# Patient Record
Sex: MALE | Race: WHITE | NOT HISPANIC OR LATINO | Employment: PART TIME | URBAN - METROPOLITAN AREA
[De-identification: names, ages, dates, MRNs, and addresses within clinical notes are randomized per-mention and may not be internally consistent; named-entity substitution may affect disease eponyms.]

---

## 2017-03-20 ENCOUNTER — FOLLOW UP (OUTPATIENT)
Dept: URBAN - METROPOLITAN AREA CLINIC 26 | Facility: CLINIC | Age: 72
End: 2017-03-20

## 2017-03-20 VITALS — DIASTOLIC BLOOD PRESSURE: 88 MMHG | HEART RATE: 74 BPM | SYSTOLIC BLOOD PRESSURE: 134 MMHG | HEIGHT: 60 IN

## 2017-03-20 DIAGNOSIS — H35.54: ICD-10-CM

## 2017-03-20 DIAGNOSIS — H43.812: ICD-10-CM

## 2017-03-20 DIAGNOSIS — H04.123: ICD-10-CM

## 2017-03-20 DIAGNOSIS — H02.836: ICD-10-CM

## 2017-03-20 DIAGNOSIS — H25.13: ICD-10-CM

## 2017-03-20 DIAGNOSIS — H02.833: ICD-10-CM

## 2017-03-20 DIAGNOSIS — H35.3130: ICD-10-CM

## 2017-03-20 PROCEDURE — G8417 CALC BMI ABV UP PARAM F/U: HCPCS

## 2017-03-20 PROCEDURE — 2019F DILATED MACUL EXAM DONE: CPT

## 2017-03-20 PROCEDURE — 92014 COMPRE OPH EXAM EST PT 1/>: CPT

## 2017-03-20 PROCEDURE — 1036F TOBACCO NON-USER: CPT

## 2017-03-20 PROCEDURE — 4177F TALK PT/CRGVR RE AREDS PREV: CPT

## 2017-03-20 PROCEDURE — 92250 FUNDUS PHOTOGRAPHY W/I&R: CPT

## 2017-03-20 PROCEDURE — 92235 FLUORESCEIN ANGRPH MLTIFRAME: CPT

## 2017-03-20 ASSESSMENT — TONOMETRY
OS_IOP_MMHG: 14
OD_IOP_MMHG: 14

## 2017-03-20 ASSESSMENT — VISUAL ACUITY
OS_SC: 20/40-2
OD_PH: 20/30
OD_SC: 20/40-2

## 2017-10-04 ENCOUNTER — FOLLOW UP (OUTPATIENT)
Dept: URBAN - METROPOLITAN AREA CLINIC 26 | Facility: CLINIC | Age: 72
End: 2017-10-04

## 2017-10-04 VITALS — DIASTOLIC BLOOD PRESSURE: 82 MMHG | SYSTOLIC BLOOD PRESSURE: 139 MMHG | HEART RATE: 66 BPM | HEIGHT: 60 IN

## 2017-10-04 DIAGNOSIS — H35.54: ICD-10-CM

## 2017-10-04 DIAGNOSIS — H35.3130: ICD-10-CM

## 2017-10-04 DIAGNOSIS — H43.812: ICD-10-CM

## 2017-10-04 PROCEDURE — 1036F TOBACCO NON-USER: CPT

## 2017-10-04 PROCEDURE — 4177F TALK PT/CRGVR RE AREDS PREV: CPT

## 2017-10-04 PROCEDURE — 92134 CPTRZ OPH DX IMG PST SGM RTA: CPT

## 2017-10-04 PROCEDURE — 2019F DILATED MACUL EXAM DONE: CPT

## 2017-10-04 PROCEDURE — 92250 FUNDUS PHOTOGRAPHY W/I&R: CPT

## 2017-10-04 PROCEDURE — G8427 DOCREV CUR MEDS BY ELIG CLIN: HCPCS

## 2017-10-04 PROCEDURE — 92014 COMPRE OPH EXAM EST PT 1/>: CPT

## 2017-10-04 PROCEDURE — 92235 FLUORESCEIN ANGRPH MLTIFRAME: CPT

## 2017-10-04 ASSESSMENT — VISUAL ACUITY
OD_SC: 20/25
OS_SC: 20/50-
OS_PH: 20/30-

## 2017-10-04 ASSESSMENT — TONOMETRY
OS_IOP_MMHG: 11
OD_IOP_MMHG: 11

## 2018-08-28 ENCOUNTER — FOLLOW UP (OUTPATIENT)
Dept: URBAN - METROPOLITAN AREA CLINIC 26 | Facility: CLINIC | Age: 73
End: 2018-08-28

## 2018-08-28 VITALS
SYSTOLIC BLOOD PRESSURE: 118 MMHG | WEIGHT: 194 LBS | BODY MASS INDEX: 29.4 KG/M2 | HEART RATE: 83 BPM | DIASTOLIC BLOOD PRESSURE: 84 MMHG | HEIGHT: 68 IN

## 2018-08-28 DIAGNOSIS — H35.3130: ICD-10-CM

## 2018-08-28 DIAGNOSIS — H04.123: ICD-10-CM

## 2018-08-28 DIAGNOSIS — H35.54: ICD-10-CM

## 2018-08-28 DIAGNOSIS — H43.812: ICD-10-CM

## 2018-08-28 DIAGNOSIS — H02.833: ICD-10-CM

## 2018-08-28 DIAGNOSIS — H25.13: ICD-10-CM

## 2018-08-28 DIAGNOSIS — H02.836: ICD-10-CM

## 2018-08-28 PROCEDURE — 92250 FUNDUS PHOTOGRAPHY W/I&R: CPT

## 2018-08-28 PROCEDURE — 92134 CPTRZ OPH DX IMG PST SGM RTA: CPT

## 2018-08-28 PROCEDURE — 92014 COMPRE OPH EXAM EST PT 1/>: CPT

## 2018-08-28 PROCEDURE — 92235 FLUORESCEIN ANGRPH MLTIFRAME: CPT

## 2018-08-28 ASSESSMENT — TONOMETRY
OS_IOP_MMHG: 12
OD_IOP_MMHG: 17

## 2018-08-28 ASSESSMENT — VISUAL ACUITY
OS_PH: 20/40+2
OS_SC: 20/50-1
OD_SC: 20/30+2

## 2019-02-26 ENCOUNTER — FOLLOW UP (OUTPATIENT)
Dept: URBAN - METROPOLITAN AREA CLINIC 26 | Facility: CLINIC | Age: 74
End: 2019-02-26

## 2019-02-26 VITALS
DIASTOLIC BLOOD PRESSURE: 68 MMHG | HEIGHT: 68 IN | HEART RATE: 72 BPM | BODY MASS INDEX: 29.4 KG/M2 | WEIGHT: 194 LBS | SYSTOLIC BLOOD PRESSURE: 112 MMHG

## 2019-02-26 DIAGNOSIS — H02.833: ICD-10-CM

## 2019-02-26 DIAGNOSIS — H04.123: ICD-10-CM

## 2019-02-26 DIAGNOSIS — H02.836: ICD-10-CM

## 2019-02-26 DIAGNOSIS — H35.3132: ICD-10-CM

## 2019-02-26 DIAGNOSIS — H43.812: ICD-10-CM

## 2019-02-26 DIAGNOSIS — H35.54: ICD-10-CM

## 2019-02-26 DIAGNOSIS — H25.13: ICD-10-CM

## 2019-02-26 PROCEDURE — 92014 COMPRE OPH EXAM EST PT 1/>: CPT

## 2019-02-26 PROCEDURE — 92250 FUNDUS PHOTOGRAPHY W/I&R: CPT

## 2019-02-26 PROCEDURE — 92235 FLUORESCEIN ANGRPH MLTIFRAME: CPT

## 2019-02-26 PROCEDURE — 92134 CPTRZ OPH DX IMG PST SGM RTA: CPT

## 2019-02-26 ASSESSMENT — VISUAL ACUITY
OS_PH: 20/40
OD_SC: 20/30
OS_SC: 20/60-2

## 2019-02-26 ASSESSMENT — TONOMETRY
OS_IOP_MMHG: 11
OD_IOP_MMHG: 10

## 2019-10-03 ENCOUNTER — FOLLOW UP (OUTPATIENT)
Dept: URBAN - METROPOLITAN AREA CLINIC 26 | Facility: CLINIC | Age: 74
End: 2019-10-03

## 2019-10-03 VITALS — HEIGHT: 60 IN | HEART RATE: 95 BPM | SYSTOLIC BLOOD PRESSURE: 128 MMHG | DIASTOLIC BLOOD PRESSURE: 76 MMHG

## 2019-10-03 DIAGNOSIS — H35.3112: ICD-10-CM

## 2019-10-03 DIAGNOSIS — H35.54: ICD-10-CM

## 2019-10-03 DIAGNOSIS — H43.812: ICD-10-CM

## 2019-10-03 DIAGNOSIS — H35.3221: ICD-10-CM

## 2019-10-03 PROCEDURE — 92134 CPTRZ OPH DX IMG PST SGM RTA: CPT

## 2019-10-03 PROCEDURE — 92250 FUNDUS PHOTOGRAPHY W/I&R: CPT

## 2019-10-03 PROCEDURE — 92014 COMPRE OPH EXAM EST PT 1/>: CPT

## 2019-10-03 PROCEDURE — 67028 INJECTION EYE DRUG: CPT

## 2019-10-03 PROCEDURE — 92235 FLUORESCEIN ANGRPH MLTIFRAME: CPT

## 2019-10-03 ASSESSMENT — TONOMETRY
OS_IOP_MMHG: 10
OD_IOP_MMHG: 12

## 2019-10-03 ASSESSMENT — VISUAL ACUITY
OD_SC: 20/40+1
OS_PH: 20/50
OS_SC: 20/70-2

## 2019-11-04 ENCOUNTER — CLINICAL PROCEDURE AND DIAGNOSTIC TESTING ONLY (OUTPATIENT)
Dept: URBAN - METROPOLITAN AREA CLINIC 26 | Facility: CLINIC | Age: 74
End: 2019-11-04

## 2019-11-04 DIAGNOSIS — H35.3112: ICD-10-CM

## 2019-11-04 DIAGNOSIS — H35.3221: ICD-10-CM

## 2019-11-04 PROCEDURE — 92134 CPTRZ OPH DX IMG PST SGM RTA: CPT

## 2019-11-04 PROCEDURE — 67028 INJECTION EYE DRUG: CPT

## 2019-11-04 PROCEDURE — 92250 FUNDUS PHOTOGRAPHY W/I&R: CPT

## 2019-11-04 ASSESSMENT — TONOMETRY: OS_IOP_MMHG: 14

## 2019-11-04 ASSESSMENT — VISUAL ACUITY
OD_SC: 20/40
OS_SC: 20/70

## 2019-12-04 ENCOUNTER — CLINICAL PROCEDURE AND DIAGNOSTIC TESTING ONLY (OUTPATIENT)
Dept: URBAN - METROPOLITAN AREA CLINIC 26 | Facility: CLINIC | Age: 74
End: 2019-12-04

## 2019-12-04 DIAGNOSIS — H35.3112: ICD-10-CM

## 2019-12-04 DIAGNOSIS — H35.3221: ICD-10-CM

## 2019-12-04 PROCEDURE — 67028 INJECTION EYE DRUG: CPT

## 2019-12-04 PROCEDURE — 92250 FUNDUS PHOTOGRAPHY W/I&R: CPT

## 2019-12-04 ASSESSMENT — VISUAL ACUITY: OS_SC: 20/70-2

## 2019-12-04 ASSESSMENT — TONOMETRY: OS_IOP_MMHG: 14

## 2020-01-02 ENCOUNTER — CLINICAL PROCEDURE AND DIAGNOSTIC TESTING ONLY (OUTPATIENT)
Dept: URBAN - METROPOLITAN AREA CLINIC 26 | Facility: CLINIC | Age: 75
End: 2020-01-02

## 2020-01-02 DIAGNOSIS — H35.3112: ICD-10-CM

## 2020-01-02 DIAGNOSIS — H35.3221: ICD-10-CM

## 2020-01-02 PROCEDURE — 67028 INJECTION EYE DRUG: CPT

## 2020-01-02 PROCEDURE — 92134 CPTRZ OPH DX IMG PST SGM RTA: CPT

## 2020-01-02 ASSESSMENT — VISUAL ACUITY: OS_SC: 20/80-2

## 2020-01-02 ASSESSMENT — TONOMETRY: OS_IOP_MMHG: 19

## 2020-02-03 ENCOUNTER — FOLLOW UP AND POST INJECTION EVALUATION (OUTPATIENT)
Dept: URBAN - METROPOLITAN AREA CLINIC 26 | Facility: CLINIC | Age: 75
End: 2020-02-03

## 2020-02-03 VITALS
WEIGHT: 197.4 LBS | HEART RATE: 80 BPM | HEIGHT: 68 IN | BODY MASS INDEX: 29.92 KG/M2 | SYSTOLIC BLOOD PRESSURE: 128 MMHG | DIASTOLIC BLOOD PRESSURE: 91 MMHG

## 2020-02-03 DIAGNOSIS — H02.833: ICD-10-CM

## 2020-02-03 DIAGNOSIS — H35.54: ICD-10-CM

## 2020-02-03 DIAGNOSIS — H43.812: ICD-10-CM

## 2020-02-03 DIAGNOSIS — H25.13: ICD-10-CM

## 2020-02-03 DIAGNOSIS — H02.836: ICD-10-CM

## 2020-02-03 DIAGNOSIS — H04.123: ICD-10-CM

## 2020-02-03 DIAGNOSIS — H35.3221: ICD-10-CM

## 2020-02-03 DIAGNOSIS — H35.3112: ICD-10-CM

## 2020-02-03 PROCEDURE — 92134 CPTRZ OPH DX IMG PST SGM RTA: CPT

## 2020-02-03 PROCEDURE — 92014 COMPRE OPH EXAM EST PT 1/>: CPT

## 2020-02-03 PROCEDURE — 67028 INJECTION EYE DRUG: CPT

## 2020-02-03 PROCEDURE — 92250 FUNDUS PHOTOGRAPHY W/I&R: CPT

## 2020-02-03 PROCEDURE — 92235 FLUORESCEIN ANGRPH MLTIFRAME: CPT

## 2020-02-03 ASSESSMENT — VISUAL ACUITY
OD_SC: 20/30+1
OS_SC: 20/80

## 2020-02-03 ASSESSMENT — TONOMETRY
OD_IOP_MMHG: 9
OS_IOP_MMHG: 9

## 2020-03-09 ENCOUNTER — CLINICAL PROCEDURE AND DIAGNOSTIC TESTING ONLY (OUTPATIENT)
Dept: URBAN - METROPOLITAN AREA CLINIC 26 | Facility: CLINIC | Age: 75
End: 2020-03-09

## 2020-03-09 DIAGNOSIS — H35.3221: ICD-10-CM

## 2020-03-09 DIAGNOSIS — H35.3112: ICD-10-CM

## 2020-03-09 PROCEDURE — 67028 INJECTION EYE DRUG: CPT

## 2020-03-09 PROCEDURE — 92134 CPTRZ OPH DX IMG PST SGM RTA: CPT

## 2020-03-09 PROCEDURE — 92250 FUNDUS PHOTOGRAPHY W/I&R: CPT

## 2020-03-09 ASSESSMENT — VISUAL ACUITY: OS_SC: 20/80

## 2020-04-20 ENCOUNTER — CLINICAL PROCEDURE AND DIAGNOSTIC TESTING ONLY (OUTPATIENT)
Dept: URBAN - METROPOLITAN AREA CLINIC 26 | Facility: CLINIC | Age: 75
End: 2020-04-20

## 2020-04-20 DIAGNOSIS — H35.3221: ICD-10-CM

## 2020-04-20 DIAGNOSIS — H35.3112: ICD-10-CM

## 2020-04-20 PROCEDURE — 92250 FUNDUS PHOTOGRAPHY W/I&R: CPT

## 2020-04-20 PROCEDURE — 67028 INJECTION EYE DRUG: CPT

## 2020-04-20 PROCEDURE — 92134 CPTRZ OPH DX IMG PST SGM RTA: CPT

## 2020-04-20 ASSESSMENT — VISUAL ACUITY: OS_SC: 20/60

## 2020-04-20 ASSESSMENT — TONOMETRY: OS_IOP_MMHG: 11

## 2020-06-08 ENCOUNTER — FOLLOW UP (OUTPATIENT)
Dept: URBAN - METROPOLITAN AREA CLINIC 26 | Facility: CLINIC | Age: 75
End: 2020-06-08

## 2020-06-08 DIAGNOSIS — H35.3221: ICD-10-CM

## 2020-06-08 DIAGNOSIS — H43.812: ICD-10-CM

## 2020-06-08 DIAGNOSIS — H04.123: ICD-10-CM

## 2020-06-08 DIAGNOSIS — H02.836: ICD-10-CM

## 2020-06-08 DIAGNOSIS — H35.3112: ICD-10-CM

## 2020-06-08 DIAGNOSIS — H02.833: ICD-10-CM

## 2020-06-08 DIAGNOSIS — H35.54: ICD-10-CM

## 2020-06-08 DIAGNOSIS — H25.13: ICD-10-CM

## 2020-06-08 PROCEDURE — 92134 CPTRZ OPH DX IMG PST SGM RTA: CPT

## 2020-06-08 PROCEDURE — 67028 INJECTION EYE DRUG: CPT

## 2020-06-08 PROCEDURE — 92250 FUNDUS PHOTOGRAPHY W/I&R: CPT

## 2020-06-08 PROCEDURE — 92014 COMPRE OPH EXAM EST PT 1/>: CPT

## 2020-06-08 ASSESSMENT — TONOMETRY
OS_IOP_MMHG: 10
OD_IOP_MMHG: 08

## 2020-06-08 ASSESSMENT — VISUAL ACUITY
OS_PH: 20/50-1
OS_SC: 20/60-2
OD_SC: 20/25-1

## 2020-08-03 ENCOUNTER — FOLLOW UP AND POST INJECTION EVALUATION (OUTPATIENT)
Dept: URBAN - METROPOLITAN AREA CLINIC 26 | Facility: CLINIC | Age: 75
End: 2020-08-03

## 2020-08-03 DIAGNOSIS — H35.3221: ICD-10-CM

## 2020-08-03 DIAGNOSIS — H35.3112: ICD-10-CM

## 2020-08-03 PROCEDURE — 67028 INJECTION EYE DRUG: CPT

## 2020-08-03 PROCEDURE — 92134 CPTRZ OPH DX IMG PST SGM RTA: CPT

## 2020-08-03 PROCEDURE — 92250 FUNDUS PHOTOGRAPHY W/I&R: CPT

## 2020-08-03 ASSESSMENT — VISUAL ACUITY: OS_SC: 20/60-2

## 2020-08-03 ASSESSMENT — TONOMETRY: OS_IOP_MMHG: 10

## 2020-09-21 ENCOUNTER — CLINICAL PROCEDURE AND DIAGNOSTIC TESTING ONLY (OUTPATIENT)
Dept: URBAN - METROPOLITAN AREA CLINIC 26 | Facility: CLINIC | Age: 75
End: 2020-09-21

## 2020-09-21 DIAGNOSIS — H35.3221: ICD-10-CM

## 2020-09-21 DIAGNOSIS — H35.3112: ICD-10-CM

## 2020-09-21 PROCEDURE — 67028 INJECTION EYE DRUG: CPT

## 2020-09-21 PROCEDURE — 92250 FUNDUS PHOTOGRAPHY W/I&R: CPT

## 2020-09-21 PROCEDURE — 92134 CPTRZ OPH DX IMG PST SGM RTA: CPT

## 2020-09-21 ASSESSMENT — VISUAL ACUITY: OS_SC: 20/50-2

## 2020-09-21 ASSESSMENT — TONOMETRY: OS_IOP_MMHG: 09

## 2020-11-02 ENCOUNTER — FOLLOW UP AND POST INJECTION EVALUATION (OUTPATIENT)
Dept: URBAN - METROPOLITAN AREA CLINIC 26 | Facility: CLINIC | Age: 75
End: 2020-11-02

## 2020-11-02 DIAGNOSIS — H35.3112: ICD-10-CM

## 2020-11-02 DIAGNOSIS — H35.3221: ICD-10-CM

## 2020-11-02 DIAGNOSIS — H02.833: ICD-10-CM

## 2020-11-02 DIAGNOSIS — H25.13: ICD-10-CM

## 2020-11-02 DIAGNOSIS — H02.836: ICD-10-CM

## 2020-11-02 DIAGNOSIS — H04.123: ICD-10-CM

## 2020-11-02 DIAGNOSIS — H35.54: ICD-10-CM

## 2020-11-02 DIAGNOSIS — H43.812: ICD-10-CM

## 2020-11-02 PROCEDURE — 67028 INJECTION EYE DRUG: CPT

## 2020-11-02 PROCEDURE — 92014 COMPRE OPH EXAM EST PT 1/>: CPT

## 2020-11-02 PROCEDURE — 92250 FUNDUS PHOTOGRAPHY W/I&R: CPT

## 2020-11-02 PROCEDURE — 92134 CPTRZ OPH DX IMG PST SGM RTA: CPT

## 2020-11-02 ASSESSMENT — VISUAL ACUITY
OS_SC: 20/60-2
OS_PH: 20/40-1
OD_SC: 20/30+2

## 2020-11-02 ASSESSMENT — TONOMETRY
OS_IOP_MMHG: 10
OD_IOP_MMHG: 11

## 2020-12-07 ENCOUNTER — CLINICAL PROCEDURE AND DIAGNOSTIC TESTING ONLY (OUTPATIENT)
Dept: URBAN - METROPOLITAN AREA CLINIC 26 | Facility: CLINIC | Age: 75
End: 2020-12-07

## 2020-12-07 DIAGNOSIS — H35.3221: ICD-10-CM

## 2020-12-07 DIAGNOSIS — H35.3112: ICD-10-CM

## 2020-12-07 PROCEDURE — 92250 FUNDUS PHOTOGRAPHY W/I&R: CPT

## 2020-12-07 PROCEDURE — 67028 INJECTION EYE DRUG: CPT

## 2020-12-07 ASSESSMENT — VISUAL ACUITY: OS_SC: 20/70-1

## 2020-12-07 ASSESSMENT — TONOMETRY: OS_IOP_MMHG: 10

## 2021-01-12 ENCOUNTER — CLINICAL PROCEDURE AND DIAGNOSTIC TESTING ONLY (OUTPATIENT)
Dept: URBAN - METROPOLITAN AREA CLINIC 26 | Facility: CLINIC | Age: 76
End: 2021-01-12

## 2021-01-12 DIAGNOSIS — H35.3112: ICD-10-CM

## 2021-01-12 DIAGNOSIS — H35.3221: ICD-10-CM

## 2021-01-12 PROCEDURE — 92250 FUNDUS PHOTOGRAPHY W/I&R: CPT

## 2021-01-12 PROCEDURE — 92134 CPTRZ OPH DX IMG PST SGM RTA: CPT

## 2021-01-12 PROCEDURE — 67028 INJECTION EYE DRUG: CPT

## 2021-01-12 ASSESSMENT — TONOMETRY: OS_IOP_MMHG: 11

## 2021-01-12 ASSESSMENT — VISUAL ACUITY
OS_PH: 20/50+
OS_SC: 20/60

## 2021-02-22 ENCOUNTER — CLINICAL PROCEDURE AND DIAGNOSTIC TESTING ONLY (OUTPATIENT)
Dept: URBAN - METROPOLITAN AREA CLINIC 26 | Facility: CLINIC | Age: 76
End: 2021-02-22

## 2021-02-22 DIAGNOSIS — H35.3112: ICD-10-CM

## 2021-02-22 DIAGNOSIS — H35.3221: ICD-10-CM

## 2021-02-22 PROCEDURE — 92250 FUNDUS PHOTOGRAPHY W/I&R: CPT

## 2021-02-22 PROCEDURE — 92134 CPTRZ OPH DX IMG PST SGM RTA: CPT

## 2021-02-22 PROCEDURE — 67028 INJECTION EYE DRUG: CPT

## 2021-02-22 ASSESSMENT — VISUAL ACUITY: OS_SC: 20/50-1

## 2021-02-22 ASSESSMENT — TONOMETRY: OS_IOP_MMHG: 12

## 2021-03-30 ENCOUNTER — FOLLOW UP AND POST INJECTION EVALUATION (OUTPATIENT)
Dept: URBAN - METROPOLITAN AREA CLINIC 26 | Facility: CLINIC | Age: 76
End: 2021-03-30

## 2021-03-30 VITALS — WEIGHT: 186 LBS | BODY MASS INDEX: 28.19 KG/M2 | HEIGHT: 68 IN

## 2021-03-30 DIAGNOSIS — H35.3221: ICD-10-CM

## 2021-03-30 DIAGNOSIS — H43.812: ICD-10-CM

## 2021-03-30 DIAGNOSIS — H35.54: ICD-10-CM

## 2021-03-30 DIAGNOSIS — H35.3112: ICD-10-CM

## 2021-03-30 PROCEDURE — 92250 FUNDUS PHOTOGRAPHY W/I&R: CPT

## 2021-03-30 PROCEDURE — 92014 COMPRE OPH EXAM EST PT 1/>: CPT

## 2021-03-30 PROCEDURE — 92134 CPTRZ OPH DX IMG PST SGM RTA: CPT

## 2021-03-30 PROCEDURE — 67028 INJECTION EYE DRUG: CPT

## 2021-03-30 ASSESSMENT — VISUAL ACUITY
OD_SC: 20/30-1
OS_SC: 20/60+2
OS_PH: 20/50-1

## 2021-03-30 ASSESSMENT — TONOMETRY
OS_IOP_MMHG: 12
OD_IOP_MMHG: 19

## 2021-05-05 ENCOUNTER — CLINICAL PROCEDURE AND DIAGNOSTIC TESTING ONLY (OUTPATIENT)
Dept: URBAN - METROPOLITAN AREA CLINIC 26 | Facility: CLINIC | Age: 76
End: 2021-05-05

## 2021-05-05 DIAGNOSIS — H35.3112: ICD-10-CM

## 2021-05-05 DIAGNOSIS — H35.3221: ICD-10-CM

## 2021-05-05 PROCEDURE — 67028 INJECTION EYE DRUG: CPT

## 2021-05-05 PROCEDURE — 92134 CPTRZ OPH DX IMG PST SGM RTA: CPT

## 2021-05-05 PROCEDURE — 92250 FUNDUS PHOTOGRAPHY W/I&R: CPT

## 2021-05-05 ASSESSMENT — TONOMETRY: OS_IOP_MMHG: 16

## 2021-05-05 ASSESSMENT — VISUAL ACUITY: OS_SC: 20/60

## 2021-06-09 ENCOUNTER — CLINICAL PROCEDURE AND DIAGNOSTIC TESTING ONLY (OUTPATIENT)
Dept: URBAN - METROPOLITAN AREA CLINIC 26 | Facility: CLINIC | Age: 76
End: 2021-06-09

## 2021-06-09 DIAGNOSIS — H35.3221: ICD-10-CM

## 2021-06-09 DIAGNOSIS — H35.3112: ICD-10-CM

## 2021-06-09 PROCEDURE — 92250 FUNDUS PHOTOGRAPHY W/I&R: CPT

## 2021-06-09 PROCEDURE — 67028 INJECTION EYE DRUG: CPT

## 2021-06-09 PROCEDURE — 92134 CPTRZ OPH DX IMG PST SGM RTA: CPT

## 2021-06-09 ASSESSMENT — TONOMETRY: OS_IOP_MMHG: 11

## 2021-06-09 ASSESSMENT — VISUAL ACUITY: OS_SC: 20/60-2

## 2021-07-14 ENCOUNTER — CLINICAL PROCEDURE AND DIAGNOSTIC TESTING ONLY (OUTPATIENT)
Dept: URBAN - METROPOLITAN AREA CLINIC 26 | Facility: CLINIC | Age: 76
End: 2021-07-14

## 2021-07-14 DIAGNOSIS — H35.3221: ICD-10-CM

## 2021-07-14 DIAGNOSIS — H35.3112: ICD-10-CM

## 2021-07-14 PROCEDURE — 92134 CPTRZ OPH DX IMG PST SGM RTA: CPT

## 2021-07-14 PROCEDURE — 67028 INJECTION EYE DRUG: CPT

## 2021-07-14 PROCEDURE — 92250 FUNDUS PHOTOGRAPHY W/I&R: CPT

## 2021-07-14 ASSESSMENT — VISUAL ACUITY: OS_SC: 20/60-2

## 2021-07-14 ASSESSMENT — TONOMETRY: OS_IOP_MMHG: 13

## 2021-08-24 ENCOUNTER — FOLLOW UP AND POST INJECTION EVALUATION (OUTPATIENT)
Dept: URBAN - METROPOLITAN AREA CLINIC 26 | Facility: CLINIC | Age: 76
End: 2021-08-24

## 2021-08-24 DIAGNOSIS — H35.54: ICD-10-CM

## 2021-08-24 DIAGNOSIS — H35.3221: ICD-10-CM

## 2021-08-24 DIAGNOSIS — H04.123: ICD-10-CM

## 2021-08-24 DIAGNOSIS — H43.812: ICD-10-CM

## 2021-08-24 DIAGNOSIS — H35.3112: ICD-10-CM

## 2021-08-24 PROCEDURE — 92250 FUNDUS PHOTOGRAPHY W/I&R: CPT

## 2021-08-24 PROCEDURE — 67028 INJECTION EYE DRUG: CPT

## 2021-08-24 PROCEDURE — 92134 CPTRZ OPH DX IMG PST SGM RTA: CPT

## 2021-08-24 PROCEDURE — 92014 COMPRE OPH EXAM EST PT 1/>: CPT

## 2021-08-24 ASSESSMENT — VISUAL ACUITY
OD_SC: 20/25-1
OS_PH: 20/50-1
OS_SC: 20/80-1

## 2021-08-24 ASSESSMENT — TONOMETRY
OS_IOP_MMHG: 10
OD_IOP_MMHG: 10

## 2021-09-29 ENCOUNTER — FOLLOW UP AND POST INJECTION EVALUATION (OUTPATIENT)
Dept: URBAN - METROPOLITAN AREA CLINIC 26 | Facility: CLINIC | Age: 76
End: 2021-09-29

## 2021-09-29 DIAGNOSIS — H04.123: ICD-10-CM

## 2021-09-29 DIAGNOSIS — H43.812: ICD-10-CM

## 2021-09-29 DIAGNOSIS — H35.54: ICD-10-CM

## 2021-09-29 DIAGNOSIS — H35.3112: ICD-10-CM

## 2021-09-29 DIAGNOSIS — H35.3221: ICD-10-CM

## 2021-09-29 PROCEDURE — 92014 COMPRE OPH EXAM EST PT 1/>: CPT

## 2021-09-29 PROCEDURE — 92134 CPTRZ OPH DX IMG PST SGM RTA: CPT

## 2021-09-29 PROCEDURE — 67028 INJECTION EYE DRUG: CPT

## 2021-09-29 PROCEDURE — 92250 FUNDUS PHOTOGRAPHY W/I&R: CPT

## 2021-09-29 ASSESSMENT — VISUAL ACUITY
OS_CC: 20/40-2
OS_SC: 20/80-1
OD_SC: 20/20-1

## 2021-09-29 ASSESSMENT — TONOMETRY
OD_IOP_MMHG: 8
OS_IOP_MMHG: 9

## 2021-11-03 ENCOUNTER — CLINICAL PROCEDURE AND DIAGNOSTIC TESTING ONLY (OUTPATIENT)
Dept: URBAN - METROPOLITAN AREA CLINIC 26 | Facility: CLINIC | Age: 76
End: 2021-11-03

## 2021-11-03 DIAGNOSIS — H35.3112: ICD-10-CM

## 2021-11-03 DIAGNOSIS — H35.3221: ICD-10-CM

## 2021-11-03 PROCEDURE — 67028 INJECTION EYE DRUG: CPT

## 2021-11-03 PROCEDURE — 92134 CPTRZ OPH DX IMG PST SGM RTA: CPT

## 2021-11-03 ASSESSMENT — TONOMETRY: OS_IOP_MMHG: 14

## 2021-11-03 ASSESSMENT — VISUAL ACUITY
OS_PH: 20/40
OS_CC: 20/80+2

## 2021-12-08 ENCOUNTER — CLINIC PROCEDURE ONLY (OUTPATIENT)
Dept: URBAN - METROPOLITAN AREA CLINIC 26 | Facility: CLINIC | Age: 76
End: 2021-12-08

## 2021-12-08 DIAGNOSIS — H35.3221: ICD-10-CM

## 2021-12-08 DIAGNOSIS — H35.3112: ICD-10-CM

## 2021-12-08 PROCEDURE — 67028 INJECTION EYE DRUG: CPT

## 2021-12-08 PROCEDURE — 92134 CPTRZ OPH DX IMG PST SGM RTA: CPT

## 2021-12-08 PROCEDURE — 92250 FUNDUS PHOTOGRAPHY W/I&R: CPT

## 2021-12-08 ASSESSMENT — VISUAL ACUITY
OS_PH: 20/40-2
OS_SC: 20/50-2

## 2021-12-08 ASSESSMENT — TONOMETRY: OS_IOP_MMHG: 11

## 2022-01-31 ENCOUNTER — CLINIC PROCEDURE ONLY (OUTPATIENT)
Dept: URBAN - METROPOLITAN AREA CLINIC 26 | Facility: CLINIC | Age: 77
End: 2022-01-31

## 2022-01-31 DIAGNOSIS — H35.3112: ICD-10-CM

## 2022-01-31 DIAGNOSIS — H35.3221: ICD-10-CM

## 2022-01-31 PROCEDURE — 67028 INJECTION EYE DRUG: CPT

## 2022-01-31 PROCEDURE — 92134 CPTRZ OPH DX IMG PST SGM RTA: CPT

## 2022-01-31 PROCEDURE — 92250 FUNDUS PHOTOGRAPHY W/I&R: CPT

## 2022-01-31 ASSESSMENT — TONOMETRY: OS_IOP_MMHG: 09

## 2022-01-31 ASSESSMENT — VISUAL ACUITY: OS_SC: 20/60-2

## 2022-03-07 ENCOUNTER — CLINIC PROCEDURE ONLY (OUTPATIENT)
Dept: URBAN - METROPOLITAN AREA CLINIC 26 | Facility: CLINIC | Age: 77
End: 2022-03-07

## 2022-03-07 DIAGNOSIS — H35.3112: ICD-10-CM

## 2022-03-07 DIAGNOSIS — H35.3221: ICD-10-CM

## 2022-03-07 DIAGNOSIS — H35.54: ICD-10-CM

## 2022-03-07 PROCEDURE — 67028 INJECTION EYE DRUG: CPT

## 2022-03-07 PROCEDURE — 92250 FUNDUS PHOTOGRAPHY W/I&R: CPT

## 2022-03-07 PROCEDURE — 92134 CPTRZ OPH DX IMG PST SGM RTA: CPT

## 2022-03-07 ASSESSMENT — TONOMETRY: OS_IOP_MMHG: 16

## 2022-04-07 ENCOUNTER — CLINIC PROCEDURE ONLY (OUTPATIENT)
Dept: URBAN - METROPOLITAN AREA CLINIC 33 | Facility: CLINIC | Age: 77
End: 2022-04-07

## 2022-04-07 DIAGNOSIS — H35.3112: ICD-10-CM

## 2022-04-07 DIAGNOSIS — H35.3221: ICD-10-CM

## 2022-04-07 PROCEDURE — 92134 CPTRZ OPH DX IMG PST SGM RTA: CPT

## 2022-04-07 PROCEDURE — 92250 FUNDUS PHOTOGRAPHY W/I&R: CPT

## 2022-04-07 PROCEDURE — 67028 INJECTION EYE DRUG: CPT

## 2022-04-07 ASSESSMENT — TONOMETRY: OS_IOP_MMHG: 14

## 2022-04-25 ENCOUNTER — TELEPHONE ENCOUNTER (OUTPATIENT)
Dept: URBAN - METROPOLITAN AREA CLINIC 9 | Facility: CLINIC | Age: 77
End: 2022-04-25

## 2022-04-25 ENCOUNTER — OFFICE VISIT (OUTPATIENT)
Age: 77
End: 2022-04-25

## 2022-05-09 ENCOUNTER — OFFICE VISIT (OUTPATIENT)
Dept: URBAN - METROPOLITAN AREA CLINIC 9 | Facility: CLINIC | Age: 77
End: 2022-05-09

## 2022-06-06 ENCOUNTER — TELEPHONE ENCOUNTER (OUTPATIENT)
Dept: URBAN - METROPOLITAN AREA CLINIC 9 | Facility: CLINIC | Age: 77
End: 2022-06-06

## 2022-06-22 ENCOUNTER — CLINIC PROCEDURE ONLY (OUTPATIENT)
Dept: URBAN - METROPOLITAN AREA CLINIC 26 | Facility: CLINIC | Age: 77
End: 2022-06-22

## 2022-06-22 DIAGNOSIS — H35.54: ICD-10-CM

## 2022-06-22 DIAGNOSIS — H35.3112: ICD-10-CM

## 2022-06-22 DIAGNOSIS — H35.3221: ICD-10-CM

## 2022-06-22 PROCEDURE — 92134 CPTRZ OPH DX IMG PST SGM RTA: CPT

## 2022-06-22 PROCEDURE — 67028 INJECTION EYE DRUG: CPT

## 2022-06-22 PROCEDURE — 92250 FUNDUS PHOTOGRAPHY W/I&R: CPT

## 2022-06-22 ASSESSMENT — VISUAL ACUITY
OS_SC: 20/100-2
OS_PH: 20/70+2

## 2022-06-22 ASSESSMENT — TONOMETRY: OS_IOP_MMHG: 11

## 2022-06-28 ENCOUNTER — OFFICE VISIT (OUTPATIENT)
Dept: URBAN - METROPOLITAN AREA SURGERY CENTER 9 | Facility: SURGERY CENTER | Age: 77
End: 2022-06-28

## 2022-07-27 ENCOUNTER — FOLLOW UP (OUTPATIENT)
Dept: URBAN - METROPOLITAN AREA CLINIC 26 | Facility: CLINIC | Age: 77
End: 2022-07-27

## 2022-07-27 VITALS — HEIGHT: 67 IN | BODY MASS INDEX: 30.45 KG/M2 | WEIGHT: 194 LBS

## 2022-07-27 DIAGNOSIS — H35.54: ICD-10-CM

## 2022-07-27 DIAGNOSIS — H35.3221: ICD-10-CM

## 2022-07-27 DIAGNOSIS — H35.3112: ICD-10-CM

## 2022-07-27 DIAGNOSIS — H04.123: ICD-10-CM

## 2022-07-27 DIAGNOSIS — H43.812: ICD-10-CM

## 2022-07-27 PROCEDURE — 67028 INJECTION EYE DRUG: CPT

## 2022-07-27 PROCEDURE — 92014 COMPRE OPH EXAM EST PT 1/>: CPT

## 2022-07-27 PROCEDURE — 92250 FUNDUS PHOTOGRAPHY W/I&R: CPT

## 2022-07-27 PROCEDURE — 92134 CPTRZ OPH DX IMG PST SGM RTA: CPT

## 2022-07-27 ASSESSMENT — TONOMETRY
OS_IOP_MMHG: 14
OD_IOP_MMHG: 13

## 2022-07-27 ASSESSMENT — VISUAL ACUITY
OS_SC: 20/80-2
OD_SC: 20/30-2
OS_PH: 20/50-2

## 2022-07-30 ENCOUNTER — TELEPHONE ENCOUNTER (OUTPATIENT)
Age: 77
End: 2022-07-30

## 2022-07-31 ENCOUNTER — TELEPHONE ENCOUNTER (OUTPATIENT)
Age: 77
End: 2022-07-31

## 2022-09-02 ENCOUNTER — CLINIC PROCEDURE ONLY (OUTPATIENT)
Dept: URBAN - METROPOLITAN AREA CLINIC 26 | Facility: CLINIC | Age: 77
End: 2022-09-02

## 2022-09-02 VITALS — SYSTOLIC BLOOD PRESSURE: 141 MMHG | DIASTOLIC BLOOD PRESSURE: 83 MMHG | HEIGHT: 60 IN | HEART RATE: 68 BPM

## 2022-09-02 DIAGNOSIS — H35.3112: ICD-10-CM

## 2022-09-02 DIAGNOSIS — H04.123: ICD-10-CM

## 2022-09-02 DIAGNOSIS — H35.54: ICD-10-CM

## 2022-09-02 DIAGNOSIS — H43.812: ICD-10-CM

## 2022-09-02 DIAGNOSIS — H35.3221: ICD-10-CM

## 2022-09-02 PROCEDURE — 92235 FLUORESCEIN ANGRPH MLTIFRAME: CPT

## 2022-09-02 PROCEDURE — 92134 CPTRZ OPH DX IMG PST SGM RTA: CPT

## 2022-09-02 PROCEDURE — 92012 INTRM OPH EXAM EST PATIENT: CPT

## 2022-09-02 PROCEDURE — 67028 INJECTION EYE DRUG: CPT

## 2022-09-02 ASSESSMENT — VISUAL ACUITY
OD_SC: 20/30-1
OS_SC: 20/80-2

## 2022-09-02 ASSESSMENT — TONOMETRY: OS_IOP_MMHG: 12

## 2022-10-19 ENCOUNTER — CLINIC PROCEDURE ONLY (OUTPATIENT)
Dept: URBAN - METROPOLITAN AREA CLINIC 26 | Facility: CLINIC | Age: 77
End: 2022-10-19

## 2022-10-19 DIAGNOSIS — H35.3112: ICD-10-CM

## 2022-10-19 DIAGNOSIS — H35.3221: ICD-10-CM

## 2022-10-19 PROCEDURE — 67028 INJECTION EYE DRUG: CPT

## 2022-10-19 PROCEDURE — 92134 CPTRZ OPH DX IMG PST SGM RTA: CPT

## 2022-10-19 PROCEDURE — 92250 FUNDUS PHOTOGRAPHY W/I&R: CPT

## 2022-10-19 ASSESSMENT — TONOMETRY: OS_IOP_MMHG: 11

## 2022-11-29 ENCOUNTER — CLINIC PROCEDURE ONLY (OUTPATIENT)
Dept: URBAN - METROPOLITAN AREA CLINIC 26 | Facility: CLINIC | Age: 77
End: 2022-11-29

## 2022-11-29 DIAGNOSIS — H35.3112: ICD-10-CM

## 2022-11-29 DIAGNOSIS — H35.3221: ICD-10-CM

## 2022-11-29 PROCEDURE — 92134 CPTRZ OPH DX IMG PST SGM RTA: CPT

## 2022-11-29 PROCEDURE — 67028 INJECTION EYE DRUG: CPT

## 2022-11-29 PROCEDURE — 92250 FUNDUS PHOTOGRAPHY W/I&R: CPT

## 2022-11-29 ASSESSMENT — TONOMETRY: OS_IOP_MMHG: 12

## 2023-01-17 ENCOUNTER — CLINIC PROCEDURE ONLY (OUTPATIENT)
Dept: URBAN - METROPOLITAN AREA CLINIC 26 | Facility: CLINIC | Age: 78
End: 2023-01-17

## 2023-01-17 DIAGNOSIS — H35.3221: ICD-10-CM

## 2023-01-17 DIAGNOSIS — H35.3112: ICD-10-CM

## 2023-01-17 PROCEDURE — 92250 FUNDUS PHOTOGRAPHY W/I&R: CPT

## 2023-01-17 PROCEDURE — 92134 CPTRZ OPH DX IMG PST SGM RTA: CPT

## 2023-01-17 PROCEDURE — 67028 INJECTION EYE DRUG: CPT

## 2023-01-17 ASSESSMENT — TONOMETRY: OS_IOP_MMHG: 12

## 2023-04-11 ENCOUNTER — CLINIC PROCEDURE ONLY (OUTPATIENT)
Dept: URBAN - METROPOLITAN AREA CLINIC 26 | Facility: CLINIC | Age: 78
End: 2023-04-11

## 2023-04-11 DIAGNOSIS — H35.3221: ICD-10-CM

## 2023-04-11 DIAGNOSIS — H35.3112: ICD-10-CM

## 2023-04-11 PROCEDURE — 92134 CPTRZ OPH DX IMG PST SGM RTA: CPT

## 2023-04-11 PROCEDURE — 67028 INJECTION EYE DRUG: CPT

## 2023-04-11 PROCEDURE — 92250 FUNDUS PHOTOGRAPHY W/I&R: CPT

## 2023-04-11 ASSESSMENT — TONOMETRY: OS_IOP_MMHG: 13

## 2023-05-16 ENCOUNTER — CLINIC PROCEDURE ONLY (OUTPATIENT)
Dept: URBAN - METROPOLITAN AREA CLINIC 26 | Facility: CLINIC | Age: 78
End: 2023-05-16

## 2023-05-16 VITALS — WEIGHT: 192.2 LBS | BODY MASS INDEX: 29.13 KG/M2 | HEIGHT: 68 IN

## 2023-05-16 DIAGNOSIS — H35.3221: ICD-10-CM

## 2023-05-16 DIAGNOSIS — H04.123: ICD-10-CM

## 2023-05-16 DIAGNOSIS — H43.812: ICD-10-CM

## 2023-05-16 DIAGNOSIS — H35.54: ICD-10-CM

## 2023-05-16 DIAGNOSIS — H35.3112: ICD-10-CM

## 2023-05-16 PROCEDURE — 67028 INJECTION EYE DRUG: CPT

## 2023-05-16 PROCEDURE — 92250 FUNDUS PHOTOGRAPHY W/I&R: CPT

## 2023-05-16 PROCEDURE — 92014 COMPRE OPH EXAM EST PT 1/>: CPT

## 2023-05-16 PROCEDURE — 92134 CPTRZ OPH DX IMG PST SGM RTA: CPT

## 2023-05-16 ASSESSMENT — TONOMETRY
OD_IOP_MMHG: 10
OS_IOP_MMHG: 16

## 2023-05-16 ASSESSMENT — VISUAL ACUITY
OS_SC: 20/100+2
OS_PH: 20/60
OD_SC: 20/30+1

## 2023-06-20 ENCOUNTER — CLINIC PROCEDURE ONLY (OUTPATIENT)
Dept: URBAN - METROPOLITAN AREA CLINIC 26 | Facility: CLINIC | Age: 78
End: 2023-06-20

## 2023-06-20 DIAGNOSIS — H35.3112: ICD-10-CM

## 2023-06-20 DIAGNOSIS — H35.3221: ICD-10-CM

## 2023-06-20 PROCEDURE — 92134 CPTRZ OPH DX IMG PST SGM RTA: CPT

## 2023-06-20 PROCEDURE — 92250 FUNDUS PHOTOGRAPHY W/I&R: CPT

## 2023-06-20 PROCEDURE — 67028 INJECTION EYE DRUG: CPT

## 2023-06-20 ASSESSMENT — TONOMETRY: OS_IOP_MMHG: 11

## 2023-07-26 ENCOUNTER — CLINIC PROCEDURE ONLY (OUTPATIENT)
Dept: URBAN - METROPOLITAN AREA CLINIC 26 | Facility: CLINIC | Age: 78
End: 2023-07-26

## 2023-07-26 DIAGNOSIS — H35.3112: ICD-10-CM

## 2023-07-26 DIAGNOSIS — H35.3221: ICD-10-CM

## 2023-07-26 PROCEDURE — 92250 FUNDUS PHOTOGRAPHY W/I&R: CPT

## 2023-07-26 PROCEDURE — 67028 INJECTION EYE DRUG: CPT

## 2023-07-26 PROCEDURE — 92134 CPTRZ OPH DX IMG PST SGM RTA: CPT

## 2023-07-26 ASSESSMENT — TONOMETRY: OS_IOP_MMHG: 12

## 2023-08-30 ENCOUNTER — CLINIC PROCEDURE ONLY (OUTPATIENT)
Dept: URBAN - METROPOLITAN AREA CLINIC 26 | Facility: CLINIC | Age: 78
End: 2023-08-30

## 2023-08-30 DIAGNOSIS — H35.3112: ICD-10-CM

## 2023-08-30 DIAGNOSIS — H35.3221: ICD-10-CM

## 2023-08-30 PROCEDURE — 67028 INJECTION EYE DRUG: CPT

## 2023-08-30 PROCEDURE — 92250 FUNDUS PHOTOGRAPHY W/I&R: CPT

## 2023-08-30 PROCEDURE — 92134 CPTRZ OPH DX IMG PST SGM RTA: CPT

## 2023-08-30 ASSESSMENT — TONOMETRY: OS_IOP_MMHG: 15

## 2023-10-04 ENCOUNTER — CLINIC PROCEDURE ONLY (OUTPATIENT)
Dept: URBAN - METROPOLITAN AREA CLINIC 26 | Facility: CLINIC | Age: 78
End: 2023-10-04

## 2023-10-04 DIAGNOSIS — H35.3221: ICD-10-CM

## 2023-10-04 DIAGNOSIS — H35.3112: ICD-10-CM

## 2023-10-04 PROCEDURE — 92134 CPTRZ OPH DX IMG PST SGM RTA: CPT

## 2023-10-04 PROCEDURE — 92250 FUNDUS PHOTOGRAPHY W/I&R: CPT | Mod: 59

## 2023-10-04 PROCEDURE — 67028 INJECTION EYE DRUG: CPT

## 2023-10-04 ASSESSMENT — TONOMETRY: OS_IOP_MMHG: 15

## 2023-11-08 ENCOUNTER — COMPREHENSIVE EXAM (OUTPATIENT)
Dept: URBAN - METROPOLITAN AREA CLINIC 26 | Facility: CLINIC | Age: 78
End: 2023-11-08

## 2023-11-08 DIAGNOSIS — H35.3221: ICD-10-CM

## 2023-11-08 DIAGNOSIS — H35.3112: ICD-10-CM

## 2023-11-08 DIAGNOSIS — H43.812: ICD-10-CM

## 2023-11-08 DIAGNOSIS — H04.123: ICD-10-CM

## 2023-11-08 DIAGNOSIS — H35.54: ICD-10-CM

## 2023-11-08 PROCEDURE — 92250 FUNDUS PHOTOGRAPHY W/I&R: CPT

## 2023-11-08 PROCEDURE — 92134 CPTRZ OPH DX IMG PST SGM RTA: CPT

## 2023-11-08 PROCEDURE — 67028 INJECTION EYE DRUG: CPT

## 2023-11-08 PROCEDURE — 92014 COMPRE OPH EXAM EST PT 1/>: CPT

## 2023-11-08 ASSESSMENT — VISUAL ACUITY
OS_SC: 20/100-2
OD_SC: 20/40-1
OS_PH: 20/70-1

## 2023-11-08 ASSESSMENT — TONOMETRY
OS_IOP_MMHG: 8
OD_IOP_MMHG: 8

## 2023-12-13 ENCOUNTER — CLINIC PROCEDURE ONLY (OUTPATIENT)
Dept: URBAN - METROPOLITAN AREA CLINIC 26 | Facility: CLINIC | Age: 78
End: 2023-12-13

## 2023-12-13 DIAGNOSIS — H35.3112: ICD-10-CM

## 2023-12-13 DIAGNOSIS — H35.3221: ICD-10-CM

## 2023-12-13 PROCEDURE — 92250 FUNDUS PHOTOGRAPHY W/I&R: CPT

## 2023-12-13 PROCEDURE — 67028 INJECTION EYE DRUG: CPT

## 2023-12-13 PROCEDURE — 92134 CPTRZ OPH DX IMG PST SGM RTA: CPT

## 2023-12-13 ASSESSMENT — TONOMETRY: OS_IOP_MMHG: 10

## 2024-01-17 ENCOUNTER — CLINIC PROCEDURE ONLY (OUTPATIENT)
Dept: URBAN - METROPOLITAN AREA CLINIC 26 | Facility: CLINIC | Age: 79
End: 2024-01-17

## 2024-01-17 DIAGNOSIS — H35.3221: ICD-10-CM

## 2024-01-17 DIAGNOSIS — H35.3112: ICD-10-CM

## 2024-01-17 PROCEDURE — 67028 INJECTION EYE DRUG: CPT

## 2024-01-17 PROCEDURE — 92250 FUNDUS PHOTOGRAPHY W/I&R: CPT

## 2024-01-17 PROCEDURE — 92134 CPTRZ OPH DX IMG PST SGM RTA: CPT

## 2024-01-17 ASSESSMENT — TONOMETRY: OS_IOP_MMHG: 14

## 2024-02-26 ENCOUNTER — CLINIC PROCEDURE ONLY (OUTPATIENT)
Dept: URBAN - METROPOLITAN AREA CLINIC 26 | Facility: CLINIC | Age: 79
End: 2024-02-26

## 2024-02-26 DIAGNOSIS — H35.3112: ICD-10-CM

## 2024-02-26 DIAGNOSIS — H35.3221: ICD-10-CM

## 2024-02-26 PROCEDURE — 67028 INJECTION EYE DRUG: CPT

## 2024-02-26 PROCEDURE — 92250 FUNDUS PHOTOGRAPHY W/I&R: CPT

## 2024-02-26 PROCEDURE — 92134 CPTRZ OPH DX IMG PST SGM RTA: CPT

## 2024-02-26 ASSESSMENT — TONOMETRY: OS_IOP_MMHG: 15

## 2024-04-01 ENCOUNTER — CLINIC PROCEDURE ONLY (OUTPATIENT)
Dept: URBAN - METROPOLITAN AREA CLINIC 26 | Facility: CLINIC | Age: 79
End: 2024-04-01

## 2024-04-01 DIAGNOSIS — H35.3112: ICD-10-CM

## 2024-04-01 DIAGNOSIS — H35.3221: ICD-10-CM

## 2024-04-01 PROCEDURE — 92250 FUNDUS PHOTOGRAPHY W/I&R: CPT | Mod: 59

## 2024-04-01 PROCEDURE — 67028 INJECTION EYE DRUG: CPT

## 2024-04-01 PROCEDURE — 92134 CPTRZ OPH DX IMG PST SGM RTA: CPT

## 2024-04-01 ASSESSMENT — TONOMETRY: OS_IOP_MMHG: 14

## 2024-04-09 ENCOUNTER — APPOINTMENT (RX ONLY)
Dept: URBAN - METROPOLITAN AREA CLINIC 116 | Facility: CLINIC | Age: 79
Setting detail: DERMATOLOGY
End: 2024-04-09

## 2024-04-09 DIAGNOSIS — L81.4 OTHER MELANIN HYPERPIGMENTATION: ICD-10-CM

## 2024-04-09 DIAGNOSIS — D49.2 NEOPLASM OF UNSPECIFIED BEHAVIOR OF BONE, SOFT TISSUE, AND SKIN: ICD-10-CM

## 2024-04-09 DIAGNOSIS — Z71.89 OTHER SPECIFIED COUNSELING: ICD-10-CM

## 2024-04-09 DIAGNOSIS — L82.1 OTHER SEBORRHEIC KERATOSIS: ICD-10-CM

## 2024-04-09 PROCEDURE — 11102 TANGNTL BX SKIN SINGLE LES: CPT

## 2024-04-09 PROCEDURE — ? COUNSELING

## 2024-04-09 PROCEDURE — ? BIOPSY BY SHAVE METHOD

## 2024-04-09 PROCEDURE — 99203 OFFICE O/P NEW LOW 30 MIN: CPT | Mod: 25

## 2024-04-09 ASSESSMENT — LOCATION DETAILED DESCRIPTION DERM
LOCATION DETAILED: LEFT ANTERIOR DISTAL THIGH
LOCATION DETAILED: RIGHT SUPERIOR LATERAL MALAR CHEEK
LOCATION DETAILED: RIGHT DISTAL RADIAL DORSAL FOREARM
LOCATION DETAILED: INFERIOR THORACIC SPINE
LOCATION DETAILED: LEFT DISTAL RADIAL DORSAL FOREARM

## 2024-04-09 ASSESSMENT — LOCATION ZONE DERM
LOCATION ZONE: ARM
LOCATION ZONE: FACE
LOCATION ZONE: LEG
LOCATION ZONE: TRUNK

## 2024-04-09 ASSESSMENT — LOCATION SIMPLE DESCRIPTION DERM
LOCATION SIMPLE: UPPER BACK
LOCATION SIMPLE: LEFT FOREARM
LOCATION SIMPLE: RIGHT CHEEK
LOCATION SIMPLE: RIGHT FOREARM
LOCATION SIMPLE: LEFT THIGH

## 2024-04-09 NOTE — PROCEDURE: COUNSELING
Detail Level: Zone
Detail Level: Generalized
Laser Recommendations: ***Recommended Medlite laser consultation
Sunscreen Recommendations: Zinc base sunscreen.

## 2024-04-09 NOTE — PROCEDURE: BIOPSY BY SHAVE METHOD
Detail Level: Detailed
Depth Of Biopsy: dermis
Was A Bandage Applied: Yes
Size Of Lesion In Cm: 1.2
X Size Of Lesion In Cm: 0
Biopsy Type: H and E
Biopsy Method: Personna blade
Anesthesia Type: 1% lidocaine with epinephrine
Anesthesia Volume In Cc: 1
Hemostasis: Sami's
Wound Care: Vaseline
Dressing: pressure dressing with telfa
Destruction After The Procedure: No
Type Of Destruction Used: Curettage
Cryotherapy Text: The wound bed was treated with cryotherapy after the biopsy was performed.
Electrodesiccation Text: The wound bed was treated with electrodesiccation after the biopsy was performed.
Electrodesiccation And Curettage Text: The wound bed was treated with electrodesiccation and curettage after the biopsy was performed.
Silver Nitrate Text: The wound bed was treated with silver nitrate after the biopsy was performed.
Lab: [id number]
Lab Facility: [id number]
Path Notes (To The Dermatopathologist): Please check margins.
Consent: verbal consent obtained
Post-Care Instructions: I reviewed with the patient in detail post-care instructions. Patient is to keep the biopsy site dry overnight, and then apply bacitracin twice daily until healed. Patient may apply hydrogen peroxide soaks to remove any crusting.
Notification Instructions: Patient will be notified of biopsy results. However, patient instructed to call the office if not contacted within 2 weeks.
Billing Type: Third-Party Bill
Information: Selecting Yes will display possible errors in your note based on the variables you have selected. This validation is only offered as a suggestion for you. PLEASE NOTE THAT THE VALIDATION TEXT WILL BE REMOVED WHEN YOU FINALIZE YOUR NOTE. IF YOU WANT TO FAX A PRELIMINARY NOTE YOU WILL NEED TO TOGGLE THIS TO 'NO' IF YOU DO NOT WANT IT IN YOUR FAXED NOTE.

## 2024-04-29 ENCOUNTER — CLINIC PROCEDURE ONLY (OUTPATIENT)
Dept: URBAN - METROPOLITAN AREA CLINIC 26 | Facility: CLINIC | Age: 79
End: 2024-04-29

## 2024-04-29 DIAGNOSIS — H35.3112: ICD-10-CM

## 2024-04-29 DIAGNOSIS — H35.3221: ICD-10-CM

## 2024-04-29 PROCEDURE — 92134 CPTRZ OPH DX IMG PST SGM RTA: CPT

## 2024-04-29 PROCEDURE — 67028 INJECTION EYE DRUG: CPT

## 2024-04-29 ASSESSMENT — TONOMETRY: OS_IOP_MMHG: 17

## 2024-06-04 ENCOUNTER — CLINIC PROCEDURE ONLY (OUTPATIENT)
Dept: URBAN - METROPOLITAN AREA CLINIC 26 | Facility: CLINIC | Age: 79
End: 2024-06-04

## 2024-06-04 DIAGNOSIS — H35.3221: ICD-10-CM

## 2024-06-04 DIAGNOSIS — H35.3112: ICD-10-CM

## 2024-06-04 PROCEDURE — 67028 INJECTION EYE DRUG: CPT

## 2024-06-04 PROCEDURE — 92250 FUNDUS PHOTOGRAPHY W/I&R: CPT | Mod: 59

## 2024-06-04 PROCEDURE — 92134 CPTRZ OPH DX IMG PST SGM RTA: CPT

## 2024-06-04 ASSESSMENT — TONOMETRY: OS_IOP_MMHG: 14

## 2024-07-10 ENCOUNTER — CLINIC PROCEDURE ONLY (OUTPATIENT)
Dept: URBAN - METROPOLITAN AREA CLINIC 26 | Facility: CLINIC | Age: 79
End: 2024-07-10

## 2024-07-10 DIAGNOSIS — H35.3221: ICD-10-CM

## 2024-07-10 DIAGNOSIS — H35.3112: ICD-10-CM

## 2024-07-10 PROCEDURE — 92250 FUNDUS PHOTOGRAPHY W/I&R: CPT | Mod: 59

## 2024-07-10 PROCEDURE — 92134 CPTRZ OPH DX IMG PST SGM RTA: CPT

## 2024-07-10 PROCEDURE — 67028 INJECTION EYE DRUG: CPT

## 2024-07-10 ASSESSMENT — TONOMETRY: OS_IOP_MMHG: 18

## 2024-08-26 ENCOUNTER — COMPREHENSIVE EXAM (OUTPATIENT)
Dept: URBAN - METROPOLITAN AREA CLINIC 26 | Facility: CLINIC | Age: 79
End: 2024-08-26

## 2024-08-26 VITALS — WEIGHT: 196 LBS | BODY MASS INDEX: 29.7 KG/M2 | HEIGHT: 68 IN

## 2024-08-26 DIAGNOSIS — H43.812: ICD-10-CM

## 2024-08-26 DIAGNOSIS — H35.54: ICD-10-CM

## 2024-08-26 DIAGNOSIS — H02.833: ICD-10-CM

## 2024-08-26 DIAGNOSIS — H04.123: ICD-10-CM

## 2024-08-26 DIAGNOSIS — H25.13: ICD-10-CM

## 2024-08-26 DIAGNOSIS — H35.3112: ICD-10-CM

## 2024-08-26 DIAGNOSIS — H02.836: ICD-10-CM

## 2024-08-26 DIAGNOSIS — H35.3221: ICD-10-CM

## 2024-08-26 PROCEDURE — 92014 COMPRE OPH EXAM EST PT 1/>: CPT | Mod: 25

## 2024-08-26 PROCEDURE — 67028 INJECTION EYE DRUG: CPT

## 2024-08-26 PROCEDURE — 92250 FUNDUS PHOTOGRAPHY W/I&R: CPT | Mod: 59

## 2024-08-26 PROCEDURE — 92134 CPTRZ OPH DX IMG PST SGM RTA: CPT

## 2024-08-26 ASSESSMENT — VISUAL ACUITY
OS_PH: 20/70+2
OS_CC: 20/80
OD_CC: 20/30-2

## 2024-08-26 ASSESSMENT — TONOMETRY
OS_IOP_MMHG: 10
OD_IOP_MMHG: 11

## 2024-10-28 ENCOUNTER — CLINIC PROCEDURE ONLY (OUTPATIENT)
Dept: URBAN - METROPOLITAN AREA CLINIC 26 | Facility: CLINIC | Age: 79
End: 2024-10-28

## 2024-10-28 DIAGNOSIS — H35.3221: ICD-10-CM

## 2024-10-28 DIAGNOSIS — H35.3112: ICD-10-CM

## 2024-10-28 PROCEDURE — 92134 CPTRZ OPH DX IMG PST SGM RTA: CPT

## 2024-10-28 PROCEDURE — 67028 INJECTION EYE DRUG: CPT

## 2024-10-28 PROCEDURE — 92250 FUNDUS PHOTOGRAPHY W/I&R: CPT | Mod: 59

## 2024-12-16 ENCOUNTER — CLINIC PROCEDURE ONLY (OUTPATIENT)
Age: 79
End: 2024-12-16

## 2024-12-16 DIAGNOSIS — H35.3112: ICD-10-CM

## 2024-12-16 DIAGNOSIS — H35.3221: ICD-10-CM

## 2024-12-16 PROCEDURE — 92250 FUNDUS PHOTOGRAPHY W/I&R: CPT | Mod: 59

## 2024-12-16 PROCEDURE — 67028 INJECTION EYE DRUG: CPT

## 2024-12-16 PROCEDURE — 92134 CPTRZ OPH DX IMG PST SGM RTA: CPT

## 2025-02-03 ENCOUNTER — CLINIC PROCEDURE ONLY (OUTPATIENT)
Age: 80
End: 2025-02-03

## 2025-02-03 DIAGNOSIS — H35.3221: ICD-10-CM

## 2025-02-03 DIAGNOSIS — H35.3112: ICD-10-CM

## 2025-02-03 PROCEDURE — 67028 INJECTION EYE DRUG: CPT

## 2025-02-03 PROCEDURE — 92134 CPTRZ OPH DX IMG PST SGM RTA: CPT

## 2025-02-03 PROCEDURE — 92250 FUNDUS PHOTOGRAPHY W/I&R: CPT | Mod: 59

## 2025-04-16 ENCOUNTER — CLINIC PROCEDURE ONLY (OUTPATIENT)
Age: 80
End: 2025-04-16

## 2025-04-16 DIAGNOSIS — H35.3112: ICD-10-CM

## 2025-04-16 DIAGNOSIS — H35.3221: ICD-10-CM

## 2025-04-16 PROCEDURE — 92134 CPTRZ OPH DX IMG PST SGM RTA: CPT

## 2025-04-16 PROCEDURE — 92250 FUNDUS PHOTOGRAPHY W/I&R: CPT | Mod: 59

## 2025-04-16 PROCEDURE — 67028 INJECTION EYE DRUG: CPT

## 2025-06-18 ENCOUNTER — CLINIC PROCEDURE ONLY (OUTPATIENT)
Age: 80
End: 2025-06-18

## 2025-06-18 DIAGNOSIS — H35.3112: ICD-10-CM

## 2025-06-18 DIAGNOSIS — H35.3221: ICD-10-CM

## 2025-06-18 PROCEDURE — 92250 FUNDUS PHOTOGRAPHY W/I&R: CPT | Mod: 59

## 2025-06-18 PROCEDURE — 92134 CPTRZ OPH DX IMG PST SGM RTA: CPT

## 2025-06-18 PROCEDURE — 67028 INJECTION EYE DRUG: CPT

## 2025-08-12 ENCOUNTER — CLINIC PROCEDURE ONLY (OUTPATIENT)
Age: 80
End: 2025-08-12

## 2025-08-12 DIAGNOSIS — H35.3221: ICD-10-CM

## 2025-08-12 DIAGNOSIS — H35.3112: ICD-10-CM

## 2025-08-12 PROCEDURE — 67028 INJECTION EYE DRUG: CPT

## 2025-08-12 PROCEDURE — 92134 CPTRZ OPH DX IMG PST SGM RTA: CPT

## 2025-08-12 PROCEDURE — 92250 FUNDUS PHOTOGRAPHY W/I&R: CPT | Mod: 59
